# Patient Record
Sex: FEMALE | Race: WHITE | NOT HISPANIC OR LATINO | Employment: OTHER | URBAN - METROPOLITAN AREA
[De-identification: names, ages, dates, MRNs, and addresses within clinical notes are randomized per-mention and may not be internally consistent; named-entity substitution may affect disease eponyms.]

---

## 2018-05-30 RX ORDER — CIDER VINEGAR 300 MG
500 TABLET ORAL DAILY
COMMUNITY

## 2018-05-30 RX ORDER — VITAMIN E 268 MG
400 CAPSULE ORAL DAILY
COMMUNITY

## 2018-06-01 ENCOUNTER — OFFICE VISIT (OUTPATIENT)
Dept: SURGERY | Facility: CLINIC | Age: 48
End: 2018-06-01

## 2018-06-01 VITALS
DIASTOLIC BLOOD PRESSURE: 60 MMHG | HEIGHT: 66 IN | WEIGHT: 136 LBS | BODY MASS INDEX: 21.86 KG/M2 | HEART RATE: 80 BPM | RESPIRATION RATE: 18 BRPM | TEMPERATURE: 97.8 F | SYSTOLIC BLOOD PRESSURE: 100 MMHG

## 2018-06-01 DIAGNOSIS — K64.8 OTHER HEMORRHOIDS: Primary | ICD-10-CM

## 2018-06-01 PROCEDURE — 99202 OFFICE O/P NEW SF 15 MIN: CPT | Performed by: PHYSICIAN ASSISTANT

## 2018-06-01 NOTE — LETTER
June 1, 2018     [facility] [address]   [address] [facility] [address]    Patient: Bryan Wbester   YOB: 1970   Date of Visit: 6/1/2018       Dear Dr Lizzie Prabhakar: Thank you for referring Bryan Webster to me for evaluation  Below are my notes for this consultation  If you have questions, please do not hesitate to call me  I look forward to following your patient along with you  Sincerely,        Debo Daniel PA-C        CC: No Recipients  Davian Ramirez  6/1/2018 12:41 PM  Sign at close encounter  Assessment/Plan:   Bryan Webster is a 51 y  o female who is here for The encounter diagnosis was Other hemorrhoids  Patient with hemorrhoid that worsened over the weekend but now is improving  On PE improving thrombosed hemorrhoid grade 3    Plan:  Conservative management with Analpram and tucks pads  Sitz baths  Hemorrhoid is softening and improving would not I&D at this time  Would not recommend surgical excision  Avoid constipation        ______________________________________________________  CC:Hemorrhoids (Thrombosed) and Patient Education (Given to pt)    HPI:  Bryan Webster is a 51 y  o female who was referred for evaluation of Hemorrhoids (Thrombosed) and Patient Education (Given to pt)    Patient states she noticed a hemorrhoid about 2 weeks ago  Five days ago area became more hard and painful despite using preparation H and whitch yu pads  Over the past couple days it pain has improved and hemorrhoid has decreased in size    Currently painful hemmorrhoid         ROS:  General ROS: negative  negative for - chills, fatigue, fever or night sweats, weight loss  Respiratory ROS: no cough, shortness of breath, or wheezing  Cardiovascular ROS: no chest pain or dyspnea on exertion  Genito-Urinary ROS: no dysuria, trouble voiding, or hematuria  Musculoskeletal ROS: negative for - gait disturbance, joint pain or muscle pain  Neurological ROS: no TIA or stroke symptoms    Rectal pain and see HPI    Skin ROS: no new rashes or lesions   Lymphatic ROS: no new adenopathy noted by pt  GYN ROS: see HPI, no new GYN history or bleeding noted  Psy ROS: no new mental or behavioral disturbances       There is no problem list on file for this patient  Allergies:  Patient has no known allergies  Current Outpatient Prescriptions:     Ascorbic Acid Buffered (BUFFERED VITAMIN C PO), Take 500 mg by mouth 2 (two) times a day, Disp: , Rfl:     Licorice, Glycyrrhiza glabra, (LICORICE PO), Take by mouth daily, Disp: , Rfl:     Rhodiola rosea (RHODIOLA PO), Take 100 mg by mouth daily, Disp: , Rfl:     vitamin E, tocopherol, 400 units capsule, Take 400 Units by mouth daily, Disp: , Rfl:     Cod Liver Oil CAPS, Take by mouth 2 (two) times a day, Disp: , Rfl:     Colostrum 500 MG CAPS, Take 500 mg by mouth daily, Disp: , Rfl:     D-MANNOSE PO, Take 500 mg by mouth 4 (four) times a day, Disp: , Rfl:     hydrocortisone-pramoxine (ANALPRAM-HC) 2 5-1 % rectal cream, Insert into the rectum 3 (three) times a day, Disp: 30 g, Rfl: 0    L-Glutamine 500 MG CAPS, Take 500 mg by mouth daily, Disp: , Rfl:     Past Medical History:   Diagnosis Date    Adrenal abnormality (HCC)     Anemia     Anxiety     Athlete's foot     Bronchitis     Chicken pox     CMV (cytomegalovirus infection) (HCC)     Depression     H/O worms     Lactose intolerance     Lyme disease     Obesity     PTSD (post-traumatic stress disorder)        Past Surgical History:   Procedure Laterality Date    APPENDECTOMY  2014       Family History   Problem Relation Age of Onset    Breast cancer Mother     Obesity Mother     Hypertension Mother     Transient ischemic attack Mother     Kidney cancer Father     Dementia Maternal Grandmother     Heart attack Paternal Grandfather     Alcohol abuse Paternal Grandfather         reports that she has never smoked   She has never used smokeless tobacco  She reports that she does not drink alcohol or use drugs  Labs:   No results found for: WBC, HGB, HCT, MCV, PLT  No results found for: NA, K, CL, CO2, ANIONGAP, BUN, CREATININE, GLUCOSE, GLUF, CALCIUM, CORRECTEDCA, AST, ALT, ALKPHOS, PROT, ALBUMIN, BILITOT, EGFR      Imaging: No new pertinent imaging studies  PHYSICAL EXAM  General Appearance:    Alert, cooperative, no distress,    Head:    Normocephalic without obvious abnormality   Eyes:    PERRL, conjunctiva/corneas clear, EOM's intact        Neck:   Supple, no adenopathy, no JVD   Back:     Symmetric, no spinal or CVA tenderness   Lungs:     Clear to auscultation bilaterally, no wheezing or rhonchi   Heart:    Regular rate and rhythm, S1 and S2 normal, no murmur   Abdomen:    soft NTND, +BS, rectum with grade 3 partially thrombosed hemmorrhoid   Extremities:   Extremities normal  No clubbing, cyanosis or edema   Psych:   Normal Affect, AOx3  Neurologic:  Skin:   CNII-XII intact  Strength symmetric, speech intact    Warm, dry, intact, no visible rashes or lesions                       Some portions of this record may have been generated with voice recognition software  There may be translation, syntax,  or grammatical errors  Occasional wrong word or "sound-a-like" substitutions may have occurred due to the inherent limitations of the voice recognition software  Read the chart carefully and recognize, using context, where substitutions may have occurred  If you have any questions, please contact the dictating provider for clarification or correction, as needed  This encounter has been coded by a non-certified coder         Madalyn Piña PA-C    Date: 6/1/2018 Time: 12:35 PM

## 2018-06-01 NOTE — PROGRESS NOTES
Assessment/Plan:   Corry Meyer is a 51 y  o female who is here for The encounter diagnosis was Other hemorrhoids  Patient with hemorrhoid that worsened over the weekend but now is improving  On PE improving thrombosed hemorrhoid grade 3    Plan:  Conservative management with Analpram and tucks pads  Sitz baths  Hemorrhoid is softening and improving would not I&D at this time  Would not recommend surgical excision  Avoid constipation        ______________________________________________________  CC:Hemorrhoids (Thrombosed) and Patient Education (Given to pt)    HPI:  Corry Meyer is a 51 y  o female who was referred for evaluation of Hemorrhoids (Thrombosed) and Patient Education (Given to pt)    Patient states she noticed a hemorrhoid about 2 weeks ago  Five days ago area became more hard and painful despite using preparation H and whitch yu pads  Over the past couple days it pain has improved and hemorrhoid has decreased in size    Currently painful hemmorrhoid  ROS:  General ROS: negative  negative for - chills, fatigue, fever or night sweats, weight loss  Respiratory ROS: no cough, shortness of breath, or wheezing  Cardiovascular ROS: no chest pain or dyspnea on exertion  Genito-Urinary ROS: no dysuria, trouble voiding, or hematuria  Musculoskeletal ROS: negative for - gait disturbance, joint pain or muscle pain  Neurological ROS: no TIA or stroke symptoms    Rectal pain and see HPI    Skin ROS: no new rashes or lesions   Lymphatic ROS: no new adenopathy noted by pt  GYN ROS: see HPI, no new GYN history or bleeding noted  Psy ROS: no new mental or behavioral disturbances       There is no problem list on file for this patient  Allergies:  Patient has no known allergies        Current Outpatient Prescriptions:     Ascorbic Acid Buffered (BUFFERED VITAMIN C PO), Take 500 mg by mouth 2 (two) times a day, Disp: , Rfl:     Licorice, Glycyrrhiza glabra, (LICORICE PO), Take by mouth daily, Disp: , Rfl:     Rhodiola rosea (RHODIOLA PO), Take 100 mg by mouth daily, Disp: , Rfl:     vitamin E, tocopherol, 400 units capsule, Take 400 Units by mouth daily, Disp: , Rfl:     Cod Liver Oil CAPS, Take by mouth 2 (two) times a day, Disp: , Rfl:     Colostrum 500 MG CAPS, Take 500 mg by mouth daily, Disp: , Rfl:     D-MANNOSE PO, Take 500 mg by mouth 4 (four) times a day, Disp: , Rfl:     hydrocortisone-pramoxine (ANALPRAM-HC) 2 5-1 % rectal cream, Insert into the rectum 3 (three) times a day, Disp: 30 g, Rfl: 0    L-Glutamine 500 MG CAPS, Take 500 mg by mouth daily, Disp: , Rfl:     Past Medical History:   Diagnosis Date    Adrenal abnormality (HCC)     Anemia     Anxiety     Athlete's foot     Bronchitis     Chicken pox     CMV (cytomegalovirus infection) (HCC)     Depression     H/O worms     Lactose intolerance     Lyme disease     Obesity     PTSD (post-traumatic stress disorder)        Past Surgical History:   Procedure Laterality Date    APPENDECTOMY  2014       Family History   Problem Relation Age of Onset    Breast cancer Mother     Obesity Mother     Hypertension Mother     Transient ischemic attack Mother     Kidney cancer Father     Dementia Maternal Grandmother     Heart attack Paternal Grandfather     Alcohol abuse Paternal Grandfather         reports that she has never smoked  She has never used smokeless tobacco  She reports that she does not drink alcohol or use drugs  Labs:   No results found for: WBC, HGB, HCT, MCV, PLT  No results found for: NA, K, CL, CO2, ANIONGAP, BUN, CREATININE, GLUCOSE, GLUF, CALCIUM, CORRECTEDCA, AST, ALT, ALKPHOS, PROT, ALBUMIN, BILITOT, EGFR      Imaging: No new pertinent imaging studies           PHYSICAL EXAM  General Appearance:    Alert, cooperative, no distress,    Head:    Normocephalic without obvious abnormality   Eyes:    PERRL, conjunctiva/corneas clear, EOM's intact        Neck:   Supple, no adenopathy, no JVD   Back: Symmetric, no spinal or CVA tenderness   Lungs:     Clear to auscultation bilaterally, no wheezing or rhonchi   Heart:    Regular rate and rhythm, S1 and S2 normal, no murmur   Abdomen:    soft NTND, +BS, rectum with grade 3 partially thrombosed hemmorrhoid   Extremities:   Extremities normal  No clubbing, cyanosis or edema   Psych:   Normal Affect, AOx3  Neurologic:  Skin:   CNII-XII intact  Strength symmetric, speech intact    Warm, dry, intact, no visible rashes or lesions                       Some portions of this record may have been generated with voice recognition software  There may be translation, syntax,  or grammatical errors  Occasional wrong word or "sound-a-like" substitutions may have occurred due to the inherent limitations of the voice recognition software  Read the chart carefully and recognize, using context, where substitutions may have occurred  If you have any questions, please contact the dictating provider for clarification or correction, as needed  This encounter has been coded by a non-certified coder         Moisés Jiang PA-C    Date: 6/1/2018 Time: 12:35 PM